# Patient Record
Sex: FEMALE | Race: WHITE | URBAN - METROPOLITAN AREA
[De-identification: names, ages, dates, MRNs, and addresses within clinical notes are randomized per-mention and may not be internally consistent; named-entity substitution may affect disease eponyms.]

---

## 2018-08-30 ENCOUNTER — OFFICE VISIT (OUTPATIENT)
Dept: FAMILY MEDICINE CLINIC | Age: 28
End: 2018-08-30

## 2018-08-30 VITALS
TEMPERATURE: 99 F | RESPIRATION RATE: 16 BRPM | SYSTOLIC BLOOD PRESSURE: 128 MMHG | DIASTOLIC BLOOD PRESSURE: 83 MMHG | HEIGHT: 66 IN | BODY MASS INDEX: 25.07 KG/M2 | HEART RATE: 75 BPM | OXYGEN SATURATION: 100 % | WEIGHT: 156 LBS

## 2018-08-30 DIAGNOSIS — K21.9 GASTROESOPHAGEAL REFLUX DISEASE WITHOUT ESOPHAGITIS: Primary | ICD-10-CM

## 2018-08-30 DIAGNOSIS — R10.13 EPIGASTRIC PAIN: ICD-10-CM

## 2018-08-30 PROBLEM — E28.2 PCOS (POLYCYSTIC OVARIAN SYNDROME): Status: ACTIVE | Noted: 2018-08-30

## 2018-08-30 RX ORDER — PHENOL/SODIUM PHENOLATE
20 AEROSOL, SPRAY (ML) MUCOUS MEMBRANE 2 TIMES DAILY
Qty: 180 TAB | Refills: 0 | Status: SHIPPED | OUTPATIENT
Start: 2018-08-30 | End: 2018-08-30 | Stop reason: SDUPTHER

## 2018-08-30 RX ORDER — PHENOL/SODIUM PHENOLATE
20 AEROSOL, SPRAY (ML) MUCOUS MEMBRANE 2 TIMES DAILY
Qty: 180 TAB | Refills: 0 | Status: SHIPPED | OUTPATIENT
Start: 2018-08-30 | End: 2018-11-24 | Stop reason: SDUPTHER

## 2018-08-30 RX ORDER — LEVONORGESTREL AND ETHINYL ESTRADIOL 0.1-0.02MG
KIT ORAL
COMMUNITY

## 2018-08-30 NOTE — PROGRESS NOTES
Subjective:      Johnson Boo is an 24 y.o. female who presents for evaluation and management of dyspepsia and epigastric pain. Symptoms have been present for approximately 4 months. Symptoms include abdominal bloating, belching and midepigastric pain. The patient denies chest pain, choking on food, deep pressure at base of neck, difficulty swallowing, dysphagia and early satiety. Symptoms appear to be worsened by alcohol, caffeine and carbonated beverages. Patient questions if she has possible ulcer. Allergies: Allergies   Allergen Reactions    Sulfa (Sulfonamide Antibiotics) Hives      Medications:     Current Outpatient Prescriptions   Medication Sig    levonorgestrel-ethinyl estradiol (AVIANE, ALESSE, LESSINA) 0.1-20 mg-mcg tab Take  by mouth.  Omeprazole delayed release (PRILOSEC D/R) 20 mg tablet Take 1 Tab by mouth two (2) times a day. No current facility-administered medications for this visit. Review of Systems  A comprehensive review of systems was negative except for that written in the HPI. Objective:        Visit Vitals    /83 (BP 1 Location: Right arm, BP Patient Position: Sitting)    Pulse 75    Temp 99 °F (37.2 °C) (Oral)    Resp 16    Ht 5' 6\" (1.676 m)    Wt 156 lb (70.8 kg)    LMP 08/08/2018    SpO2 100%    BMI 25.18 kg/m2     Visit Vitals    /83 (BP 1 Location: Right arm, BP Patient Position: Sitting)    Pulse 75    Temp 99 °F (37.2 °C) (Oral)    Resp 16    Ht 5' 6\" (1.676 m)    Wt 156 lb (70.8 kg)    LMP 08/08/2018    SpO2 100%    BMI 25.18 kg/m2     General:  Alert, cooperative, no distress, appears stated age. Throat: Lips, mucosa, and tongue normal. Teeth and gums normal.   Neck: Supple, symmetrical, trachea midline, no adenopathy, thyroid: no enlargement/tenderness/nodules, no carotid bruit and no JVD. Lungs:   Clear to auscultation bilaterally. Chest wall:  No tenderness or deformity.    Heart:  Regular rate and rhythm, S1, S2 normal, no murmur, click, rub or gallop. Abdomen:   Soft, non-tender. Bowel sounds normal. No masses,  No organomegaly. Assessment/ Plan   · GERD, currently under inadequate control. · Begin medication: Prilosec. continue and rtc as needed. Consider GI consult as needed. The risks and benefits of my recommendations, as well as other treatment options were discussed with the patient today. Questions were answered. Nonpharmacologic treatments were discussed including: eating smaller meals, elevation of the head of bed at night, avoidance of caffeine, chocolate, nicotine and peppermint, avoiding tight fitting clothing. Encounter Diagnoses   Name Primary?  Gastroesophageal reflux disease without esophagitis Yes    Epigastric pain      Orders Placed This Encounter    levonorgestrel-ethinyl estradiol (AVIANE, ALEMALENAE, LESSINA) 0.1-20 mg-mcg tab    DISCONTD: Omeprazole delayed release (PRILOSEC D/R) 20 mg tablet    Omeprazole delayed release (PRILOSEC D/R) 20 mg tablet   .

## 2018-08-30 NOTE — MR AVS SNAPSHOT
[address] [place] [address] [address] [facility] [address] [phone] Patient: Tomma Soulier MRN: [id number] :10/8/1997 Visit Information Date & Time Provider Department Dept. Phone Encounter #  
 2018  3:15 PM Lucien Lopez, 3 [facility] [phone] [id number] Follow-up Instructions Return in about 3 months (around 2018). Upcoming Health Maintenance Date Due Hepatitis A Peds Age 1-18 (1 of 2 - Standard Series) 10/8/1999 DTaP/Tdap/Td series (1 - Tdap) 10/8/2005 HPV Age 9Y-34Y (1 of 3 - Female 3 Dose Series) 10/8/2009 Influenza Age 5 to Adult 2018 Allergies as of 2018  Review Complete On: 2018 By: Lucien Lopez NP Severity Noted Reaction Type Reactions Sulfa (Sulfonamide Antibiotics)  2018    Hives Current Immunizations  Never Reviewed No immunizations on file. Not reviewed this visit You Were Diagnosed With   
  
 Codes Comments Gastroesophageal reflux disease without esophagitis    -  Primary ICD-10-CM: K21.9 ICD-9-CM: 530.81 Epigastric pain     ICD-10-CM: R10.13 ICD-9-CM: 789.06 Vitals BP Pulse Temp Resp Height(growth percentile) Weight(growth percentile) 128/83 (96 %/ 96 %)* (BP 1 Location: Right arm, BP Patient Position: Sitting) 75 99 °F (37.2 °C) (Oral) 16 5' 6\" (1.676 m) (75 %, Z= 0.67) 156 lb (70.8 kg) (85 %, Z= 1.02) LMP SpO2 BMI OB Status Smoking Status 2018 100% 25.18 kg/m2 (79 %, Z= 0.82) Having regular periods Never Smoker *BP percentiles are based on NHBPEP's 4th Report Growth percentiles are based on CDC 2-20 Years data. BMI and BSA Data Body Mass Index Body Surface Area  
 25.18 kg/m 2 1.82 m 2 Preferred Pharmacy Pharmacy Name Phone [facility] [address] [facility] [address]. [phone] Your Updated Medication List  
 This list is accurate as of 8/30/18  3:41 PM.  Always use your most recent med list.  
  
  
  
  
 levonorgestrel-ethinyl estradiol 0.1-20 mg-mcg Tab Commonly known as:  Alex [place] Take  by mouth. Omeprazole delayed release 20 mg tablet Commonly known as:  PRILOSEC D/R Take 1 Tab by mouth two (2) times a day. Prescriptions Sent to Pharmacy Refills Omeprazole delayed release (PRILOSEC D/R) 20 mg tablet 0 Sig: Take 1 Tab by mouth two (2) times a day. Class: Normal  
 Pharmacy: [facility]/pharmacy #[id number]- [address] #: [phone] Route: Oral  
  
Follow-up Instructions Return in about 3 months (around 11/30/2018). Patient Instructions Gastroesophageal Reflux Disease (GERD): Care Instructions Your Care Instructions Gastroesophageal reflux disease (GERD) is the backward flow of stomach acid into the esophagus. The esophagus is the tube that leads from your throat to your stomach. A one-way valve prevents the stomach acid from moving up into this tube. When you have GERD, this valve does not close tightly enough. If you have mild GERD symptoms including heartburn, you may be able to control the problem with antacids or over-the-counter medicine. Changing your diet, losing weight, and making other lifestyle changes can also help reduce symptoms. Follow-up care is a key part of your treatment and safety. Be sure to make and go to all appointments, and call your doctor if you are having problems. It's also a good idea to know your test results and keep a list of the medicines you take. How can you care for yourself at home? · Take your medicines exactly as prescribed. Call your doctor if you think you are having a problem with your medicine. · Your doctor may recommend over-the-counter medicine.  For mild or occasional indigestion, antacids, such as Tums, Gaviscon, Mylanta, or Maalox, may help. Your doctor also may recommend over-the-counter acid reducers, such as Pepcid AC, Tagamet HB, Zantac 75, or Prilosec. Read and follow all instructions on the label. If you use these medicines often, talk with your doctor. · Change your eating habits. ¨ It's best to eat several small meals instead of two or three large meals. ¨ After you eat, wait 2 to 3 hours before you lie down. ¨ Chocolate, mint, and alcohol can make GERD worse. ¨ Spicy foods, foods that have a lot of acid (like tomatoes and oranges), and coffee can make GERD symptoms worse in some people. If your symptoms are worse after you eat a certain food, you may want to stop eating that food to see if your symptoms get better. · Do not smoke or chew tobacco. Smoking can make GERD worse. If you need help quitting, talk to your doctor about stop-smoking programs and medicines. These can increase your chances of quitting for good. · If you have GERD symptoms at night, raise the head of your bed 6 to 8 inches by putting the frame on blocks or placing a foam wedge under the head of your mattress. (Adding extra pillows does not work.) · Do not wear tight clothing around your middle. · Lose weight if you need to. Losing just 5 to 10 pounds can help. When should you call for help? Call your doctor now or seek immediate medical care if: 
  · You have new or different belly pain.  
  · Your stools are black and tarlike or have streaks of blood.  
 Watch closely for changes in your health, and be sure to contact your doctor if: 
  · Your symptoms have not improved after 2 days.  
  · Food seems to catch in your throat or chest.  
Where can you learn more? Go to http://blessing-justin.info/. Enter E491 in the search box to learn more about \"Gastroesophageal Reflux Disease (GERD): Care Instructions. \" Current as of: May 12, 2017 Content Version: 11.7 © 1113-8769 [employer].  Care instructions adapted under license by [address] (which disclaims liability or warranty for this information). If you have questions about a medical condition or this instruction, always ask your healthcare professional. Sayda Spencer any warranty or liability for your use of this information. Introducing \[facility] [place] [facility]\"" & HEALTH SERVICES! [facility] introduces EZ-Ticket patient portal. Now you can access parts of your medical record, email your doctor's office, and request medication refills online. 1. In your internet browser, go to [email or web address] 2. Click on the First Time User? Click Here link in the Sign In box. You will see the New Member Sign Up page. 3. Enter your EZ-Ticket Access Code exactly as it appears below. You will not need to use this code after youve completed the sign-up process. If you do not sign up before the expiration date, you must request a new code. · EZ-Ticket Access Code: [email or web address] Expires: 11/28/2018  3:41 PM 
 
4. Enter the last four digits of your Social Security Number (xxxx) and Date of Birth (mm/dd/yyyy) as indicated and click Submit. You will be taken to the next sign-up page. 5. Create a EZ-Ticket ID. This will be your EZ-Ticket login ID and cannot be changed, so think of one that is secure and easy to remember. 6. Create a EZ-Ticket password. You can change your password at any time. 7. Enter your Password Reset Question and Answer. This can be used at a later time if you forget your password. 8. Enter your e-mail address. You will receive e-mail notification when new information is available in [address] 9. Click Sign Up. You can now view and download portions of your medical record. 10. Click the Download Summary menu link to download a portable copy of your medical information. If you have questions, please visit the Frequently Asked Questions section of the EZ-Ticket website.  Remember, EZ-Ticket is NOT to be used for urgent needs. For medical emergencies, dial 911. Now available from your iPhone and Android! Please provide this summary of care documentation to your next provider. If you have any questions after today's visit, please call [phone].

## 2018-08-30 NOTE — PATIENT INSTRUCTIONS

## 2018-08-30 NOTE — PROGRESS NOTES
Tomma Soulier is a 24 y.o. female that is here to establish care and get a physical.     1. Have you been to the ER, urgent care clinic since your last visit? Hospitalized since your last visit? No    2. Have you seen or consulted any other health care providers outside of the [address] [place] [address] since your last visit? Include any pap smears or colon screening.  No    Health Maintenance Due   Topic Date Due    Hepatitis A Peds Age 1-18 (1 of 2 - Standard Series) 10/08/1999    DTaP/Tdap/Td series (1 - Tdap) 10/08/2005    HPV Age 9Y-34Y (1 of 3 - Female 3 Dose Series) 10/08/2009    Influenza Age 5 to Adult  08/01/2018

## 2018-09-06 ENCOUNTER — TELEPHONE (OUTPATIENT)
Dept: FAMILY MEDICINE CLINIC | Age: 28
End: 2018-09-06

## 2018-09-06 DIAGNOSIS — Z00.00 ROUTINE GENERAL MEDICAL EXAMINATION AT A HEALTH CARE FACILITY: Primary | ICD-10-CM

## 2018-09-06 DIAGNOSIS — Z00.00 ROUTINE GENERAL MEDICAL EXAMINATION AT A HEALTH CARE FACILITY: ICD-10-CM

## 2018-09-06 NOTE — TELEPHONE ENCOUNTER
Pt came in this morning for her labs she stated that Brendan Reddy told her she would be putting in orders for her. There are no orders put in. Pt states that she was fine with coming in tomorrow.      Please review her chart and order her labs accordingly for tomorrow

## 2018-11-27 RX ORDER — OMEPRAZOLE 20 MG/1
CAPSULE, DELAYED RELEASE ORAL
Qty: 180 CAP | Refills: 0 | Status: SHIPPED | OUTPATIENT
Start: 2018-11-27 | End: 2019-02-27 | Stop reason: SDUPTHER

## 2019-02-27 RX ORDER — OMEPRAZOLE 20 MG/1
CAPSULE, DELAYED RELEASE ORAL
Qty: 180 CAP | Refills: 1 | Status: SHIPPED | OUTPATIENT
Start: 2019-02-27

## 2019-02-27 RX ORDER — OMEPRAZOLE 20 MG/1
CAPSULE, DELAYED RELEASE ORAL
Qty: 180 CAP | Refills: 0 | Status: SHIPPED | OUTPATIENT
Start: 2019-02-27 | End: 2019-02-27 | Stop reason: ALTCHOICE